# Patient Record
Sex: FEMALE | Race: WHITE | NOT HISPANIC OR LATINO | Employment: UNEMPLOYED | URBAN - NONMETROPOLITAN AREA
[De-identification: names, ages, dates, MRNs, and addresses within clinical notes are randomized per-mention and may not be internally consistent; named-entity substitution may affect disease eponyms.]

---
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
## 2023-01-03 ENCOUNTER — ROUTINE PRENATAL (OUTPATIENT)
Dept: OBSTETRICS AND GYNECOLOGY | Facility: CLINIC | Age: 18
End: 2023-01-03
Payer: COMMERCIAL

## 2023-01-03 VITALS — DIASTOLIC BLOOD PRESSURE: 84 MMHG | WEIGHT: 173 LBS | SYSTOLIC BLOOD PRESSURE: 126 MMHG | BODY MASS INDEX: 32.69 KG/M2

## 2023-01-03 DIAGNOSIS — Z34.03 ENCOUNTER FOR SUPERVISION OF NORMAL FIRST PREGNANCY IN THIRD TRIMESTER: ICD-10-CM

## 2023-01-03 DIAGNOSIS — O99.019 IRON DEFICIENCY ANEMIA OF PREGNANCY: ICD-10-CM

## 2023-01-03 DIAGNOSIS — R51.9 HEADACHE IN PREGNANCY, ANTEPARTUM, THIRD TRIMESTER: ICD-10-CM

## 2023-01-03 DIAGNOSIS — O99.820 GBS (GROUP B STREPTOCOCCUS CARRIER), +RV CULTURE, CURRENTLY PREGNANT: ICD-10-CM

## 2023-01-03 DIAGNOSIS — O26.893 HEADACHE IN PREGNANCY, ANTEPARTUM, THIRD TRIMESTER: ICD-10-CM

## 2023-01-03 DIAGNOSIS — D50.9 IRON DEFICIENCY ANEMIA OF PREGNANCY: ICD-10-CM

## 2023-01-03 DIAGNOSIS — Z3A.38 38 WEEKS GESTATION OF PREGNANCY: Primary | ICD-10-CM

## 2023-01-03 PROBLEM — Z3A.31 31 WEEKS GESTATION OF PREGNANCY: Status: RESOLVED | Noted: 2022-11-16 | Resolved: 2023-01-03

## 2023-01-03 PROCEDURE — 99213 OFFICE O/P EST LOW 20 MIN: CPT | Performed by: NURSE PRACTITIONER

## 2023-01-03 NOTE — PROGRESS NOTES
CC: Prenatal visit    Rand Santos is a 17 y.o.  at 38w4d.  Doing well.  No complaints.  She was encouraged to get induced at 39 weeks due to \"risk of increased  section\".  Discussed IOL risks along with consideration of favorable cervix.  Offered SVE today, she desires.  Denies contractions, LOF, or VB.  Reports good FM.     /84   Wt 78.5 kg (173 lb)   LMP  (LMP Unknown) Comment: patient believes last period was March  BMI 32.69 kg/m²   SVE: /-4  BSUS: fetus vertex            Problems (from 22 to present)     Problem Noted Resolved    GBS (group B Streptococcus carrier), +RV culture, currently pregnant 2022 by Leticia Sarmiento APRN No    Iron deficiency anemia of pregnancy 2022 by Carolina Cid APRN No    Headache in pregnancy, antepartum, third trimester 2022 by Leticia Sarmiento APRN No    Encounter for supervision of normal first pregnancy in third trimester 2022 by Leticia Sarmiento APRN No    Overview Signed 2022 10:34 AM by Leticia Sarmiento APRN     A pos/ Rubella immune / GBS @35weeks  DatinT US, ALLYSSA 23  Genetics: Neg, GIRL  Tdap: 10/28/22  Flu: Declines  Anatomy: @TPG, repeat for pyelectasis WNL   1h Glucola: Passed  H&H/Plts:   Lab Results   Component Value Date    HGB 9.1 (L) 10/28/2022    HCT 28.1 (L) 10/28/2022     10/28/2022     Breast/BC undecided                A/P: Rand Santos is a 17 y.o.  at 38w4d.  Pt would like to consider IOL over the next week and let us know at next appt what she decides   - RTC in 1 weeks     Diagnosis Plan   1. 38 weeks gestation of pregnancy        2. Encounter for supervision of normal first pregnancy in third trimester        3. Iron deficiency anemia of pregnancy        4. Headache in pregnancy, antepartum, third trimester        5. GBS (group B Streptococcus carrier), +RV culture, currently pregnant             Delmis Tao, ROSINA  1/3/2023  15:20 CST

## 2023-01-11 ENCOUNTER — ROUTINE PRENATAL (OUTPATIENT)
Dept: OBSTETRICS AND GYNECOLOGY | Facility: CLINIC | Age: 18
End: 2023-01-11
Payer: COMMERCIAL

## 2023-01-11 VITALS — WEIGHT: 171.8 LBS | SYSTOLIC BLOOD PRESSURE: 130 MMHG | DIASTOLIC BLOOD PRESSURE: 84 MMHG | BODY MASS INDEX: 32.46 KG/M2

## 2023-01-11 DIAGNOSIS — D50.9 IRON DEFICIENCY ANEMIA OF PREGNANCY: Primary | ICD-10-CM

## 2023-01-11 DIAGNOSIS — O99.019 IRON DEFICIENCY ANEMIA OF PREGNANCY: Primary | ICD-10-CM

## 2023-01-11 DIAGNOSIS — O99.820 GBS (GROUP B STREPTOCOCCUS CARRIER), +RV CULTURE, CURRENTLY PREGNANT: ICD-10-CM

## 2023-01-11 DIAGNOSIS — O26.893 HEADACHE IN PREGNANCY, ANTEPARTUM, THIRD TRIMESTER: ICD-10-CM

## 2023-01-11 DIAGNOSIS — Z34.03 ENCOUNTER FOR SUPERVISION OF NORMAL FIRST PREGNANCY IN THIRD TRIMESTER: ICD-10-CM

## 2023-01-11 DIAGNOSIS — R51.9 HEADACHE IN PREGNANCY, ANTEPARTUM, THIRD TRIMESTER: ICD-10-CM

## 2023-01-11 PROCEDURE — 99213 OFFICE O/P EST LOW 20 MIN: CPT | Performed by: STUDENT IN AN ORGANIZED HEALTH CARE EDUCATION/TRAINING PROGRAM

## 2023-01-11 RX ORDER — FAMOTIDINE 20 MG/1
20 TABLET, FILM COATED ORAL DAILY
Qty: 30 TABLET | Refills: 2 | Status: SHIPPED | OUTPATIENT
Start: 2023-01-11 | End: 2023-01-19 | Stop reason: HOSPADM

## 2023-01-17 ENCOUNTER — HOSPITAL ENCOUNTER (INPATIENT)
Facility: HOSPITAL | Age: 18
LOS: 2 days | Discharge: HOME | End: 2023-01-19
Attending: OBSTETRICS & GYNECOLOGY | Admitting: OBSTETRICS & GYNECOLOGY
Payer: COMMERCIAL

## 2023-01-17 ENCOUNTER — ANESTHESIA EVENT (OUTPATIENT)
Dept: LABOR AND DELIVERY | Facility: HOSPITAL | Age: 18
End: 2023-01-17
Payer: COMMERCIAL

## 2023-01-17 ENCOUNTER — ANESTHESIA (OUTPATIENT)
Dept: LABOR AND DELIVERY | Facility: HOSPITAL | Age: 18
End: 2023-01-17
Payer: COMMERCIAL

## 2023-01-17 PROBLEM — Z37.9 NORMAL LABOR: Status: ACTIVE | Noted: 2023-01-17

## 2023-01-17 LAB
ABO GROUP BLD: NORMAL
ALBUMIN SERPL-MCNC: 3.4 G/DL (ref 3.5–5.2)
ALBUMIN/GLOB SERPL: 1.2 G/DL
ALP SERPL-CCNC: 215 U/L (ref 39–117)
ALT SERPL W P-5'-P-CCNC: 8 U/L (ref 1–33)
AMPHET+METHAMPHET UR QL: NEGATIVE
AMPHETAMINES UR QL: NEGATIVE
ANION GAP SERPL CALCULATED.3IONS-SCNC: 12 MMOL/L (ref 5–15)
AST SERPL-CCNC: 15 U/L (ref 1–32)
BARBITURATES UR QL SCN: NEGATIVE
BENZODIAZ UR QL SCN: NEGATIVE
BILIRUB SERPL-MCNC: 0.2 MG/DL (ref 0–1.2)
BLD GP AB SCN SERPL QL: NEGATIVE
BUN SERPL-MCNC: 5 MG/DL (ref 6–20)
BUN/CREAT SERPL: 11.1 (ref 7–25)
BUPRENORPHINE SERPL-MCNC: NEGATIVE NG/ML
CALCIUM SPEC-SCNC: 8.8 MG/DL (ref 8.6–10.5)
CANNABINOIDS SERPL QL: NEGATIVE
CHLORIDE SERPL-SCNC: 105 MMOL/L (ref 98–107)
CO2 SERPL-SCNC: 20 MMOL/L (ref 22–29)
COCAINE UR QL: NEGATIVE
CREAT SERPL-MCNC: 0.45 MG/DL (ref 0.57–1)
CREAT UR-MCNC: 71.1 MG/DL
DEPRECATED RDW RBC AUTO: 50.3 FL (ref 37–54)
EGFRCR SERPLBLD CKD-EPI 2021: 142.3 ML/MIN/1.73
ERYTHROCYTE [DISTWIDTH] IN BLOOD BY AUTOMATED COUNT: 16.1 % (ref 12.3–15.4)
GLOBULIN UR ELPH-MCNC: 2.9 GM/DL
GLUCOSE SERPL-MCNC: 85 MG/DL (ref 65–99)
HCT VFR BLD AUTO: 36 % (ref 34–46.6)
HGB BLD-MCNC: 11.7 G/DL (ref 12–15.9)
Lab: NORMAL
MCH RBC QN AUTO: 27.8 PG (ref 26.6–33)
MCHC RBC AUTO-ENTMCNC: 32.5 G/DL (ref 31.5–35.7)
MCV RBC AUTO: 85.5 FL (ref 79–97)
METHADONE UR QL SCN: NEGATIVE
OPIATES UR QL: NEGATIVE
OXYCODONE UR QL SCN: NEGATIVE
PCP UR QL SCN: NEGATIVE
PLATELET # BLD AUTO: 197 10*3/MM3 (ref 140–450)
PMV BLD AUTO: 10.8 FL (ref 6–12)
POTASSIUM SERPL-SCNC: 4.2 MMOL/L (ref 3.5–5.2)
PROPOXYPH UR QL: NEGATIVE
PROT ?TM UR-MCNC: 18.4 MG/DL
PROT SERPL-MCNC: 6.3 G/DL (ref 6–8.5)
PROT/CREAT UR: 258.8 MG/G CREA (ref 0–200)
RBC # BLD AUTO: 4.21 10*6/MM3 (ref 3.77–5.28)
RH BLD: POSITIVE
SODIUM SERPL-SCNC: 137 MMOL/L (ref 136–145)
T&S EXPIRATION DATE: NORMAL
TRICYCLICS UR QL SCN: NEGATIVE
WBC NRBC COR # BLD: 9.06 10*3/MM3 (ref 3.4–10.8)

## 2023-01-17 PROCEDURE — 82570 ASSAY OF URINE CREATININE: CPT | Performed by: OBSTETRICS & GYNECOLOGY

## 2023-01-17 PROCEDURE — C1755 CATHETER, INTRASPINAL: HCPCS | Performed by: NURSE ANESTHETIST, CERTIFIED REGISTERED

## 2023-01-17 PROCEDURE — 80053 COMPREHEN METABOLIC PANEL: CPT | Performed by: OBSTETRICS & GYNECOLOGY

## 2023-01-17 PROCEDURE — 85027 COMPLETE CBC AUTOMATED: CPT | Performed by: OBSTETRICS & GYNECOLOGY

## 2023-01-17 PROCEDURE — S0260 H&P FOR SURGERY: HCPCS | Performed by: OBSTETRICS & GYNECOLOGY

## 2023-01-17 PROCEDURE — 0 PENICILLIN G POTASSIUM PER 600000 UNITS: Performed by: OBSTETRICS & GYNECOLOGY

## 2023-01-17 PROCEDURE — 86901 BLOOD TYPING SEROLOGIC RH(D): CPT | Performed by: OBSTETRICS & GYNECOLOGY

## 2023-01-17 PROCEDURE — 25010000002 BUTORPHANOL PER 1 MG: Performed by: OBSTETRICS & GYNECOLOGY

## 2023-01-17 PROCEDURE — 51703 INSERT BLADDER CATH COMPLEX: CPT

## 2023-01-17 PROCEDURE — 3E033VJ INTRODUCTION OF OTHER HORMONE INTO PERIPHERAL VEIN, PERCUTANEOUS APPROACH: ICD-10-PCS | Performed by: STUDENT IN AN ORGANIZED HEALTH CARE EDUCATION/TRAINING PROGRAM

## 2023-01-17 PROCEDURE — 25010000002 FENTANYL CITRATE (PF) 100 MCG/2ML SOLUTION: Performed by: NURSE ANESTHETIST, CERTIFIED REGISTERED

## 2023-01-17 PROCEDURE — 86900 BLOOD TYPING SEROLOGIC ABO: CPT | Performed by: OBSTETRICS & GYNECOLOGY

## 2023-01-17 PROCEDURE — 86850 RBC ANTIBODY SCREEN: CPT | Performed by: OBSTETRICS & GYNECOLOGY

## 2023-01-17 PROCEDURE — 59410 OBSTETRICAL CARE: CPT | Performed by: STUDENT IN AN ORGANIZED HEALTH CARE EDUCATION/TRAINING PROGRAM

## 2023-01-17 PROCEDURE — 84156 ASSAY OF PROTEIN URINE: CPT | Performed by: OBSTETRICS & GYNECOLOGY

## 2023-01-17 PROCEDURE — 80306 DRUG TEST PRSMV INSTRMNT: CPT | Performed by: OBSTETRICS & GYNECOLOGY

## 2023-01-17 PROCEDURE — C1755 CATHETER, INTRASPINAL: HCPCS

## 2023-01-17 RX ORDER — IBUPROFEN 800 MG/1
800 TABLET ORAL EVERY 8 HOURS
Status: DISCONTINUED | OUTPATIENT
Start: 2023-01-17 | End: 2023-01-19 | Stop reason: HOSPADM

## 2023-01-17 RX ORDER — FENTANYL CITRATE 50 UG/ML
INJECTION, SOLUTION INTRAMUSCULAR; INTRAVENOUS AS NEEDED
Status: DISCONTINUED | OUTPATIENT
Start: 2023-01-17 | End: 2023-01-17 | Stop reason: SURG

## 2023-01-17 RX ORDER — PRENATAL VIT/IRON FUM/FOLIC AC 27MG-0.8MG
1 TABLET ORAL DAILY
Status: DISCONTINUED | OUTPATIENT
Start: 2023-01-18 | End: 2023-01-19 | Stop reason: HOSPADM

## 2023-01-17 RX ORDER — ACETAMINOPHEN 500 MG
1000 TABLET ORAL EVERY 6 HOURS
Status: DISCONTINUED | OUTPATIENT
Start: 2023-01-18 | End: 2023-01-19 | Stop reason: HOSPADM

## 2023-01-17 RX ORDER — ONDANSETRON 4 MG/1
4 TABLET, FILM COATED ORAL EVERY 6 HOURS PRN
Status: DISCONTINUED | OUTPATIENT
Start: 2023-01-17 | End: 2023-01-19 | Stop reason: HOSPADM

## 2023-01-17 RX ORDER — MISOPROSTOL 200 UG/1
800 TABLET ORAL AS NEEDED
Status: DISCONTINUED | OUTPATIENT
Start: 2023-01-17 | End: 2023-01-17 | Stop reason: HOSPADM

## 2023-01-17 RX ORDER — FERROUS SULFATE TAB EC 324 MG (65 MG FE EQUIVALENT) 324 (65 FE) MG
324 TABLET DELAYED RESPONSE ORAL
Status: DISCONTINUED | OUTPATIENT
Start: 2023-01-18 | End: 2023-01-19 | Stop reason: HOSPADM

## 2023-01-17 RX ORDER — OXYTOCIN/0.9 % SODIUM CHLORIDE 30/500 ML
2 PLASTIC BAG, INJECTION (ML) INTRAVENOUS
Status: DISCONTINUED | OUTPATIENT
Start: 2023-01-17 | End: 2023-01-18

## 2023-01-17 RX ORDER — SODIUM CHLORIDE 0.9 % (FLUSH) 0.9 %
1-10 SYRINGE (ML) INJECTION AS NEEDED
Status: DISCONTINUED | OUTPATIENT
Start: 2023-01-17 | End: 2023-01-19 | Stop reason: HOSPADM

## 2023-01-17 RX ORDER — IBUPROFEN 800 MG/1
800 TABLET ORAL EVERY 8 HOURS
Status: DISCONTINUED | OUTPATIENT
Start: 2023-01-17 | End: 2023-01-17 | Stop reason: HOSPADM

## 2023-01-17 RX ORDER — HYDROCORTISONE 25 MG/G
1 CREAM TOPICAL AS NEEDED
Status: DISCONTINUED | OUTPATIENT
Start: 2023-01-17 | End: 2023-01-19 | Stop reason: HOSPADM

## 2023-01-17 RX ORDER — BUTORPHANOL TARTRATE 1 MG/ML
2 INJECTION, SOLUTION INTRAMUSCULAR; INTRAVENOUS
Status: DISCONTINUED | OUTPATIENT
Start: 2023-01-17 | End: 2023-01-17 | Stop reason: HOSPADM

## 2023-01-17 RX ORDER — ONDANSETRON 2 MG/ML
4 INJECTION INTRAMUSCULAR; INTRAVENOUS EVERY 6 HOURS PRN
Status: DISCONTINUED | OUTPATIENT
Start: 2023-01-17 | End: 2023-01-17 | Stop reason: HOSPADM

## 2023-01-17 RX ORDER — SODIUM CHLORIDE 0.9 % (FLUSH) 0.9 %
3 SYRINGE (ML) INJECTION EVERY 12 HOURS SCHEDULED
Status: DISCONTINUED | OUTPATIENT
Start: 2023-01-17 | End: 2023-01-17 | Stop reason: HOSPADM

## 2023-01-17 RX ORDER — ONDANSETRON 2 MG/ML
4 INJECTION INTRAMUSCULAR; INTRAVENOUS EVERY 6 HOURS PRN
Status: DISCONTINUED | OUTPATIENT
Start: 2023-01-17 | End: 2023-01-19 | Stop reason: HOSPADM

## 2023-01-17 RX ORDER — OXYTOCIN/0.9 % SODIUM CHLORIDE 30/500 ML
999 PLASTIC BAG, INJECTION (ML) INTRAVENOUS ONCE
Status: COMPLETED | OUTPATIENT
Start: 2023-01-17 | End: 2023-01-17

## 2023-01-17 RX ORDER — ONDANSETRON 4 MG/1
4 TABLET, FILM COATED ORAL EVERY 6 HOURS PRN
Status: DISCONTINUED | OUTPATIENT
Start: 2023-01-17 | End: 2023-01-17 | Stop reason: HOSPADM

## 2023-01-17 RX ORDER — OXYTOCIN/0.9 % SODIUM CHLORIDE 30/500 ML
2 PLASTIC BAG, INJECTION (ML) INTRAVENOUS
Status: DISCONTINUED | OUTPATIENT
Start: 2023-01-17 | End: 2023-01-17

## 2023-01-17 RX ORDER — PROMETHAZINE HYDROCHLORIDE 25 MG/1
25 TABLET ORAL EVERY 6 HOURS PRN
Status: DISCONTINUED | OUTPATIENT
Start: 2023-01-17 | End: 2023-01-17 | Stop reason: HOSPADM

## 2023-01-17 RX ORDER — CARBOPROST TROMETHAMINE 250 UG/ML
250 INJECTION, SOLUTION INTRAMUSCULAR AS NEEDED
Status: DISCONTINUED | OUTPATIENT
Start: 2023-01-17 | End: 2023-01-17 | Stop reason: HOSPADM

## 2023-01-17 RX ORDER — SODIUM CHLORIDE, SODIUM LACTATE, POTASSIUM CHLORIDE, CALCIUM CHLORIDE 600; 310; 30; 20 MG/100ML; MG/100ML; MG/100ML; MG/100ML
125 INJECTION, SOLUTION INTRAVENOUS CONTINUOUS
Status: DISCONTINUED | OUTPATIENT
Start: 2023-01-17 | End: 2023-01-18

## 2023-01-17 RX ORDER — DOCUSATE SODIUM 100 MG/1
100 CAPSULE, LIQUID FILLED ORAL 2 TIMES DAILY
Status: DISCONTINUED | OUTPATIENT
Start: 2023-01-17 | End: 2023-01-19 | Stop reason: HOSPADM

## 2023-01-17 RX ORDER — PROMETHAZINE HYDROCHLORIDE 12.5 MG/1
12.5 SUPPOSITORY RECTAL EVERY 6 HOURS PRN
Status: DISCONTINUED | OUTPATIENT
Start: 2023-01-17 | End: 2023-01-17 | Stop reason: HOSPADM

## 2023-01-17 RX ORDER — ACETAMINOPHEN 500 MG
1000 TABLET ORAL EVERY 6 HOURS
Status: DISCONTINUED | OUTPATIENT
Start: 2023-01-17 | End: 2023-01-17 | Stop reason: HOSPADM

## 2023-01-17 RX ORDER — SODIUM CHLORIDE 0.9 % (FLUSH) 0.9 %
3-10 SYRINGE (ML) INJECTION AS NEEDED
Status: DISCONTINUED | OUTPATIENT
Start: 2023-01-17 | End: 2023-01-17 | Stop reason: HOSPADM

## 2023-01-17 RX ORDER — LIDOCAINE HYDROCHLORIDE AND EPINEPHRINE 15; 5 MG/ML; UG/ML
INJECTION, SOLUTION EPIDURAL AS NEEDED
Status: DISCONTINUED | OUTPATIENT
Start: 2023-01-17 | End: 2023-01-17 | Stop reason: SURG

## 2023-01-17 RX ORDER — BUTORPHANOL TARTRATE 1 MG/ML
1 INJECTION, SOLUTION INTRAMUSCULAR; INTRAVENOUS
Status: DISCONTINUED | OUTPATIENT
Start: 2023-01-17 | End: 2023-01-17 | Stop reason: HOSPADM

## 2023-01-17 RX ORDER — CALCIUM CARBONATE 200(500)MG
2 TABLET,CHEWABLE ORAL 3 TIMES DAILY PRN
Status: DISCONTINUED | OUTPATIENT
Start: 2023-01-17 | End: 2023-01-19 | Stop reason: HOSPADM

## 2023-01-17 RX ORDER — OXYTOCIN/0.9 % SODIUM CHLORIDE 30/500 ML
250 PLASTIC BAG, INJECTION (ML) INTRAVENOUS CONTINUOUS
Status: ACTIVE | OUTPATIENT
Start: 2023-01-17 | End: 2023-01-17

## 2023-01-17 RX ORDER — METHYLERGONOVINE MALEATE 0.2 MG/ML
200 INJECTION INTRAVENOUS ONCE AS NEEDED
Status: DISCONTINUED | OUTPATIENT
Start: 2023-01-17 | End: 2023-01-17 | Stop reason: HOSPADM

## 2023-01-17 RX ORDER — BISACODYL 10 MG
10 SUPPOSITORY, RECTAL RECTAL DAILY PRN
Status: DISCONTINUED | OUTPATIENT
Start: 2023-01-18 | End: 2023-01-19 | Stop reason: HOSPADM

## 2023-01-17 RX ORDER — LIDOCAINE HYDROCHLORIDE 10 MG/ML
5 INJECTION, SOLUTION EPIDURAL; INFILTRATION; INTRACAUDAL; PERINEURAL AS NEEDED
Status: DISCONTINUED | OUTPATIENT
Start: 2023-01-17 | End: 2023-01-17 | Stop reason: HOSPADM

## 2023-01-17 RX ADMIN — OXYTOCIN-SODIUM CHLORIDE 0.9% IV SOLN 30 UNIT/500ML 999 ML/HR: 30-0.9/5 SOLUTION at 19:43

## 2023-01-17 RX ADMIN — OXYTOCIN-SODIUM CHLORIDE 0.9% IV SOLN 30 UNIT/500ML 250 ML/HR: 30-0.9/5 SOLUTION at 20:04

## 2023-01-17 RX ADMIN — SODIUM CHLORIDE, POTASSIUM CHLORIDE, SODIUM LACTATE AND CALCIUM CHLORIDE 1000 ML: 600; 310; 30; 20 INJECTION, SOLUTION INTRAVENOUS at 16:23

## 2023-01-17 RX ADMIN — BUTORPHANOL TARTRATE 2 MG: 1 INJECTION, SOLUTION INTRAMUSCULAR; INTRAVENOUS at 14:54

## 2023-01-17 RX ADMIN — OXYTOCIN-SODIUM CHLORIDE 0.9% IV SOLN 30 UNIT/500ML 2 MILLI-UNITS/MIN: 30-0.9/5 SOLUTION at 12:44

## 2023-01-17 RX ADMIN — IBUPROFEN 800 MG: 800 TABLET, FILM COATED ORAL at 23:02

## 2023-01-17 RX ADMIN — DOCUSATE SODIUM 100 MG: 100 CAPSULE, LIQUID FILLED ORAL at 23:02

## 2023-01-17 RX ADMIN — ACETAMINOPHEN 1000 MG: 500 TABLET ORAL at 23:02

## 2023-01-17 RX ADMIN — FENTANYL CITRATE 200 MCG: 50 INJECTION, SOLUTION INTRAMUSCULAR; INTRAVENOUS at 16:49

## 2023-01-17 RX ADMIN — SODIUM CHLORIDE 5 MILLION UNITS: 9 INJECTION, SOLUTION INTRAVENOUS at 10:15

## 2023-01-17 RX ADMIN — SODIUM CHLORIDE 3 MILLION UNITS: 9 INJECTION, SOLUTION INTRAVENOUS at 14:02

## 2023-01-17 RX ADMIN — SODIUM CHLORIDE 3 MILLION UNITS: 9 INJECTION, SOLUTION INTRAVENOUS at 18:47

## 2023-01-17 RX ADMIN — LIDOCAINE HYDROCHLORIDE AND EPINEPHRINE 3 ML: 15; 5 INJECTION, SOLUTION EPIDURAL at 16:44

## 2023-01-17 RX ADMIN — SODIUM CHLORIDE, POTASSIUM CHLORIDE, SODIUM LACTATE AND CALCIUM CHLORIDE 125 ML/HR: 600; 310; 30; 20 INJECTION, SOLUTION INTRAVENOUS at 09:55

## 2023-01-17 NOTE — ANESTHESIA PROCEDURE NOTES
Labor Epidural      Patient reassessed immediately prior to procedure    Patient location during procedure: OB  Performed By  CRNA/BRIAN: Bayron Jolly CRNA  Preanesthetic Checklist  Completed: patient identified, IV checked, site marked, risks and benefits discussed, surgical consent, monitors and equipment checked, pre-op evaluation and timeout performed  Prep:  Pt Position:sitting  Sterile Tech:gloves, cap, gown, mask and sterile barrier  Prep:DuraPrep  Monitoring:blood pressure monitoring and continuous pulse oximetry  Epidural Block Procedure:  Approach:midline  Guidance:landmark technique and palpation technique  Location:L3-L4  Needle Type:Tuohy  Needle Gauge:17 G  Loss of Resistance Medium: air  Loss of Resistance: 6cm  Cath Depth at skin:12 cm  Paresthesia: none  Aspiration:negative  Test Dose:negative  Number of Attempts: 1  Post Assessment:  Dressing:occlusive dressing applied and secured with tape  Pt Tolerance:patient tolerated the procedure well with no apparent complications  Complications:no

## 2023-01-17 NOTE — ANESTHESIA PREPROCEDURE EVALUATION
Anesthesia Evaluation     NPO Solid Status: > 8 hours  NPO Liquid Status: > 2 hours           Airway   Mallampati: II  TM distance: >3 FB  Neck ROM: full  no difficulty expected  Dental - normal exam     Pulmonary - normal exam   Cardiovascular - normal exam        Neuro/Psych  (+) headaches, numbness,    GI/Hepatic/Renal/Endo      Musculoskeletal     Abdominal    Substance History      OB/GYN    (+) Pregnant,         Other                        Anesthesia Plan    ASA 2     epidural       Anesthetic plan, risks, benefits, and alternatives have been provided, discussed and informed consent has been obtained with: patient.        CODE STATUS:    Code Status (Patient has no pulse and is not breathing): CPR (Attempt to Resuscitate)  Medical Interventions (Patient has pulse or is breathing): Full

## 2023-01-17 NOTE — H&P
[facility]  HISTORY & PHYSICAL - Obstetrics    Name: Rand Santos  MRN: [id number]  Location: L769/1  Date: 2023   CSN: [id number]      CHIEF COMPLAINT:  Contractions    HISTORY OF PRESENT ILLNESS  Rand Santos is a 17 y.o.  at 40w4d who presents with a chief complaint of contractions.  She is scheduled for IOL tomorrow.  Today denies LOF or vaginal bleeding.  Reports good FM.    Patient denies any chest pain, palpitations, headaches, lightheadedness, shortness of breath, cough, nausea, vomiting, diarrhea, constipation, fever, or chills.    ROS  Review of Systems   Constitutional: Negative.    HENT: Negative.    Eyes: Negative.    Respiratory: Negative.    Cardiovascular: Negative.    Gastrointestinal: Negative.    Endocrine: Negative.    Genitourinary: Negative.    Musculoskeletal: Negative.    Skin: Negative.    Allergic/Immunologic: Negative.    Neurological: Negative.    Hematological: Negative.    Psychiatric/Behavioral: Negative.      PRENATAL LAB RESULTS  Prenatal labs reviewed  External Prenatal Results     Pregnancy Outside Results - Transcribed From Office Records - See Scanned Records For Details     Test Value Date Time    ABO  A  22 1116    Rh  Positive  22 1116    Antibody Screen  Negative  22 1116    Varicella IgG       Rubella  3.40 index 22 1116    Hgb  9.1 g/dL 10/28/22 1016       11.6 g/dL 22 1116    Hct  28.1 % 10/28/22 1016       37.8 % 22 1116    Glucose Fasting GTT       Glucose Tolerance Test 1 hour       Glucose Tolerance Test 3 hour       Gonorrhea (discrete)  Negative  22 1059    Chlamydia (discrete)  Negative  22 1059    RPR  Non-Reactive  22 1116    VDRL       Syphilis Antibody       HBsAg  Non-Reactive  22 1116    Herpes Simplex Virus PCR       Herpes Simplex VIrus Culture       HIV  Non-Reactive  22 1116    Hep C RNA Quant PCR       Hep C Antibody  Non-Reactive   22 1116    AFP       Group B Strep  Positive  22 0859    GBS Susceptibility to Clindamycin       GBS Susceptibility to Erythromycin       Fetal Fibronectin       Genetic Testing, Maternal Blood             Drug Screening     Test Value Date Time    Urine Drug Screen       Amphetamine Screen  Negative  22 1059    Barbiturate Screen  Negative  22 1059    Benzodiazepine Screen  Negative  22 1059    Methadone Screen  Negative  22 1059    Phencyclidine Screen  Negative  22 1059    Opiates Screen  Negative  22 1059    THC Screen  Negative  22 1059    Cocaine Screen       Propoxyphene Screen  Negative  22 1059    Buprenorphine Screen  Negative  22 1059    Methamphetamine Screen       Oxycodone Screen  Negative  22 1059    Tricyclic Antidepressants Screen  Negative  22 1059          Legend    ^: Historical                      PRENATAL RISK FACTORS   Problems (from 22 to present)     Problem Noted Resolved    GBS (group B Streptococcus carrier), +RV culture, currently pregnant 2022 by Leticia Sarmiento APRN No    Iron deficiency anemia of pregnancy 2022 by Carolina Cid APRN No    Headache in pregnancy, antepartum, third trimester 2022 by Leticia Sarmiento APRN No    Encounter for supervision of normal first pregnancy in third trimester 2022 by Leticia Sarmiento APRN No    Overview Signed 2022 10:34 AM by Leticia Sarmiento APRN     A pos/ Rubella immune / GBS @35weeks  DatinT US, ALLYSSA 23  Genetics: Neg, GIRL  Tdap: 10/28/22  Flu: Declines  Anatomy: @TPG, repeat for pyelectasis WNL   1h Glucola: Passed  H&H/Plts:   Lab Results   Component Value Date    HGB 9.1 (L) 10/28/2022    HCT 28.1 (L) 10/28/2022     10/28/2022     Breast/BC undecided              OB HISTORY  OB History    Para Term  AB Living   1 0 0 0 0 0   SAB IAB  Ectopic Molar Multiple Live Births   0 0 0 0 0 0      # Outcome Date GA Lbr Elliot/2nd Weight Sex Delivery Anes PTL Lv   1 Current              GYN HISTORY  Denies h/o sexually transmitted infections/pelvic inflammatory disease  Denies h/o gynecologic surgeries, including biopsies of the cervix    PAST MEDICAL HISTORY  Past Medical History:   Diagnosis Date   • Acne vulgaris      PAST SURGICAL HISTORY  No past surgical history on file.    FAMILY HISTORY  Family History   Problem Relation Age of Onset   • No Known Problems Father    • No Known Problems Brother    • No Known Problems Sister    • Thyroid disease Maternal Grandmother         disorder   • Uterine cancer Maternal Grandmother    • Diabetes Maternal Aunt    • Heart disease Maternal Uncle    • Uterine cancer Other      SOCIAL HISTORY  Social History     Socioeconomic History   • Marital status: Single   Tobacco Use   • Smoking status: Never   • Smokeless tobacco: Never   • Tobacco comments:     dad smokes outside   Substance and Sexual Activity   • Alcohol use: No   • Drug use: No   • Sexual activity: Yes     Partners: Male     Birth control/protection: None     ALLERGIES  No Known Allergies    HOME MEDICATIONS  Prior to Admission medications    Medication Sig Start Date End Date Taking? Authorizing Provider   famotidine (Pepcid) 20 MG tablet Take 1 tablet by mouth Daily. 1/11/23 1/11/24  Em Barakat,    ferrous sulfate 325 (65 FE) MG tablet Take 1 tablet by mouth 2 (Two) Times a Day. 10/31/22   Erica Alberts MD   ondansetron ODT (Zofran ODT) 8 MG disintegrating tablet Place 1 tablet on the tongue Every 8 (Eight) Hours As Needed for Nausea or Vomiting. 8/17/22   Leticia Sarmiento APRN   Prenatal Vit-Fe Fumarate-FA (Prenatal Vitamin and Mineral) 28-0.8 MG tablet Take 1 tablet by mouth Daily. 5/13/22   Leticia Sarmiento APRN     PHYSICAL EXAM  /92   Pulse 85   Temp 98.5 °F (36.9 °C) (Oral)   LMP  (LMP  Unknown) Comment: patient believes last period was March  SpO2 98%   General: No acute distress. Well developed, well nourished. Pleasant.  Heart: Regular rate and rhythm. No murmurs, rubs, or gallops  Lungs: Clear to auscultation bilaterally. No wheezes, rales, or rhonchi.  Abdomen: Soft, nontender to palpation, enlarged by gravid uterus.    NST Review  Indication: Labor  FHT: Baseline 145 bpm, moderate variability, pos accelerations, neg decelerations.  [place]: Irregular contractions  Impression: FHT Cat 1     SVE per RN: 2-3/70/-2, vertex    IMPRESSION  Rand Santos is a 17 y.o.  at 40w4d admitted with latent labor; will augment as necessary.    PLAN  1.  IUP at 40w4d with labor  - Admit: Labor and Delivery  - Attending: Dr. Alberts  - Condition: Stable  - Vitals: per protocol  - Activity: ad celi  - Nursing: Continuous electronic fetal monitoring, as per protocol  - Diet: Clears  - IV fluids:  mL/hr  - Meds: Pitocin if needed  - Allergies: NKDA  - Labs: CBC, T&S, UDS  - GBS: POS.  Antibiotics: PCN  - Rand Santos and I have discussed pain goals for this hospitalization after reviewing her current clinical condition, medical history and prior pain experiences.  The goal is to keep her pain level appropriate.  Patient may have epidural if desired.  - Anticipate     This document has been electronically signed by Erica Alberts MD on 2023 09:20 CST.

## 2023-01-17 NOTE — PLAN OF CARE
Problem: Adult Inpatient Plan of Care  Goal: Plan of Care Review  Outcome: Ongoing, Progressing  Flowsheets (Taken 1/17/2023 4398)  Progress: improving  Plan of Care Reviewed With:   spouse   patient  Outcome Evaluation: Pt is 5/100%/0 with arom of clear fluid. GBS +, treated x2 so far. Pt is having a girl and naming her Melissa May. She plans to breastfeed. Has an epidural and a rowe catheter.

## 2023-01-18 PROCEDURE — 0HQ9XZZ REPAIR PERINEUM SKIN, EXTERNAL APPROACH: ICD-10-PCS | Performed by: STUDENT IN AN ORGANIZED HEALTH CARE EDUCATION/TRAINING PROGRAM

## 2023-01-18 PROCEDURE — 0503F POSTPARTUM CARE VISIT: CPT | Performed by: STUDENT IN AN ORGANIZED HEALTH CARE EDUCATION/TRAINING PROGRAM

## 2023-01-18 RX ADMIN — IBUPROFEN 800 MG: 800 TABLET, FILM COATED ORAL at 14:33

## 2023-01-18 RX ADMIN — Medication 1 APPLICATION: at 15:24

## 2023-01-18 RX ADMIN — IBUPROFEN 800 MG: 800 TABLET, FILM COATED ORAL at 09:12

## 2023-01-18 RX ADMIN — PRENATAL VIT W/ FE FUMARATE-FA TAB 27-0.8 MG 1 TABLET: 27-0.8 TAB at 09:12

## 2023-01-18 RX ADMIN — ACETAMINOPHEN 1000 MG: 500 TABLET ORAL at 05:08

## 2023-01-18 RX ADMIN — FERROUS SULFATE TAB EC 324 MG (65 MG FE EQUIVALENT) 324 MG: 324 (65 FE) TABLET DELAYED RESPONSE at 09:12

## 2023-01-18 RX ADMIN — DOCUSATE SODIUM 100 MG: 100 CAPSULE, LIQUID FILLED ORAL at 09:12

## 2023-01-18 RX ADMIN — ACETAMINOPHEN 1000 MG: 500 TABLET ORAL at 18:18

## 2023-01-18 RX ADMIN — ACETAMINOPHEN 1000 MG: 500 TABLET ORAL at 12:03

## 2023-01-18 RX ADMIN — DOCUSATE SODIUM 100 MG: 100 CAPSULE, LIQUID FILLED ORAL at 20:55

## 2023-01-18 NOTE — PLAN OF CARE
Goal Outcome Evaluation:  Plan of Care Reviewed With: patient, significant other           Outcome Evaluation: FF, VSS, IV out, lochia light, ambulating in room, voiding, pain tolerated with scheduled meds

## 2023-01-18 NOTE — PROGRESS NOTES
[facility]  Progress Note - Obstetrics    Name: Rand Santos  MRN: [id number]  Location: M757/1  Date: 2023  CSN: [id number]       PPD #1 s/p  at 40 with 4 with term labor     Patient seen and examined.  Denies headache, dizziness, chest pain, shortness of breath, nausea, vomiting.  Minimal/moderate lochia.  OOB and ambulating.  Tolerating regular diet.  Voiding without difficulty.  Breast feeding.    PHYSICAL EXAM  /85 (BP Location: Left arm, Patient Position: Lying)   Pulse 81   Temp 98.4 °F (36.9 °C) (Oral)   Resp 18   LMP  (LMP Unknown) Comment: patient believes last period was March  SpO2 98%   Breastfeeding Yes   General: AAOx3, no apparent distress.  Lungs: no increased work of breathing  CV: Acceptable rate, regular rhythm  Abdomen: +BS, soft, nondistended, uterine fundus firm, nontender, and below umbilicus.  Lower extremities: nontender, trace bilateral edema.     IMPRESSION  Rand Santos is a 17 y.o.  PPD #1 s/p .      PLAN  1.  Postpartum s/p   - Continue routine postpartum care.  - Diet: Pregnancy/breastfeeding  - DVT prophylaxis: SCDs, ambulation  - Breastfeeding  - Birth control options were discussed and patient decided on patch at 4-6 weeks PP  - Discharge to home today.  Follow-up in 2 weeks (telephone visit), 6 weeks with OB/GYN provider.       This document has been electronically signed by Em Barakat DO on 2023 08:07 CST

## 2023-01-18 NOTE — PAYOR COMM NOTE
"Ani Quispe  [facility]  Case Management Extender  [phone] phone  [phone] fax    Michaela Hermosillo May (17 y.o. Female)     Date of Birth   2005    Social Security Number       Address   [address] [id number]    Home Phone   [phone]    MRN   [id number]       Scientology   Unknown    Marital Status   Single                            Admission Date   1/17/23    Admission Type   Elective    Admitting Provider   Erica Alberts MD    Attending Provider   Erica Alberts MD    Department, Room/Bed   [facility] MOTHER BABY, M757/1       Discharge Date       Discharge Disposition       Discharge Destination                               Attending Provider: Erica Alberts MD    Allergies: No Known Allergies    Isolation: None   Infection: None   Code Status: CPR    Ht: 154.9 cm (61\")   Wt: 77.9 kg (171 lb 12.8 oz)    Admission Cmt: None   Principal Problem: Normal labor [O80,Z37.9]                 Active Insurance as of 1/17/2023     Primary Coverage     Payor Plan Insurance Group Employer/Plan Group    [place]      Payor Plan Address Payor Plan Phone Number Payor Plan Fax Number Effective Dates    [address] [address] [phone]  10/21/2016 - None Entered    [facility] [address]       Subscriber Name Subscriber Birth Date Member ID       MICHAELA HERMOSILLO MAY 2005 [id number]                 Emergency Contacts      (Rel.) Home Phone Work Phone Mobile Phone    Shelly Hermosillo (Mother) [phone] -- [phone]               History & Physical      Erica Alberts MD at 01/17/23 0920          [facility]  HISTORY & PHYSICAL - Obstetrics    Name: Michaela Hermosillo  MRN: [id number]  Location: L769/1  Date: 1/17/2023   CSN: [id number]      CHIEF COMPLAINT:  Contractions    HISTORY OF PRESENT ILLNESS  Michaela " Gabby Santos is a 17 y.o.  at 40w4d who presents with a chief complaint of contractions.  She is scheduled for IOL tomorrow.  Today denies LOF or vaginal bleeding.  Reports good FM.    Patient denies any chest pain, palpitations, headaches, lightheadedness, shortness of breath, cough, nausea, vomiting, diarrhea, constipation, fever, or chills.    ROS  Review of Systems   Constitutional: Negative.    HENT: Negative.    Eyes: Negative.    Respiratory: Negative.    Cardiovascular: Negative.    Gastrointestinal: Negative.    Endocrine: Negative.    Genitourinary: Negative.    Musculoskeletal: Negative.    Skin: Negative.    Allergic/Immunologic: Negative.    Neurological: Negative.    Hematological: Negative.    Psychiatric/Behavioral: Negative.      PRENATAL LAB RESULTS  Prenatal labs reviewed  External Prenatal Results     Pregnancy Outside Results - Transcribed From Office Records - See Scanned Records For Details     Test Value Date Time    ABO  A  22 1116    Rh  Positive  22 1116    Antibody Screen  Negative  22 1116    Varicella IgG       Rubella  3.40 index 22 1116    Hgb  9.1 g/dL 10/28/22 1016       11.6 g/dL 22 1116    Hct  28.1 % 10/28/22 1016       37.8 % 22 1116    Glucose Fasting GTT       Glucose Tolerance Test 1 hour       Glucose Tolerance Test 3 hour       Gonorrhea (discrete)  Negative  22 1059    Chlamydia (discrete)  Negative  22 1059    RPR  Non-Reactive  22 1116    VDRL       Syphilis Antibody       HBsAg  Non-Reactive  22 1116    Herpes Simplex Virus PCR       Herpes Simplex VIrus Culture       HIV  Non-Reactive  22 1116    Hep C RNA Quant PCR       Hep C Antibody  Non-Reactive  22 1116    AFP       Group B Strep  Positive  22 0859    GBS Susceptibility to Clindamycin       GBS Susceptibility to Erythromycin       Fetal Fibronectin       Genetic Testing, Maternal Blood             Drug Screening     Test Value Date  Time    Urine Drug Screen       Amphetamine Screen  Negative  22 1059    Barbiturate Screen  Negative  22 1059    Benzodiazepine Screen  Negative  22 1059    Methadone Screen  Negative  22 1059    Phencyclidine Screen  Negative  22 1059    Opiates Screen  Negative  22 1059    THC Screen  Negative  22 1059    Cocaine Screen       Propoxyphene Screen  Negative  22 1059    Buprenorphine Screen  Negative  22 1059    Methamphetamine Screen       Oxycodone Screen  Negative  22 1059    Tricyclic Antidepressants Screen  Negative  22 1059          Legend    ^: Historical                      PRENATAL RISK FACTORS   Problems (from 22 to present)     Problem Noted Resolved    GBS (group B Streptococcus carrier), +RV culture, currently pregnant 2022 by Leticia Sarmiento APRN No    Iron deficiency anemia of pregnancy 2022 by Carolina Cid APRN No    Headache in pregnancy, antepartum, third trimester 2022 by Leticia Sarmiento APRN No    Encounter for supervision of normal first pregnancy in third trimester 2022 by Leticia Sarmiento APRN No    Overview Signed 2022 10:34 AM by Leticia Sarmiento APRN     A pos/ Rubella immune / GBS @35weeks  DatinT US, ALLYSSA 23  Genetics: Neg, GIRL  Tdap: 10/28/22  Flu: Declines  Anatomy: @TPG, repeat for pyelectasis WNL   1h Glucola: Passed  H&H/Plts:   Lab Results   Component Value Date    HGB 9.1 (L) 10/28/2022    HCT 28.1 (L) 10/28/2022     10/28/2022     Breast/BC undecided              OB HISTORY  OB History    Para Term  AB Living   1 0 0 0 0 0   SAB IAB Ectopic Molar Multiple Live Births   0 0 0 0 0 0      # Outcome Date GA Lbr Elliot/2nd Weight Sex Delivery Anes PTL Lv   1 Current              GYN HISTORY  Denies h/o sexually transmitted infections/pelvic inflammatory disease  Denies h/o gynecologic  surgeries, including biopsies of the cervix    PAST MEDICAL HISTORY  Past Medical History:   Diagnosis Date   • Acne vulgaris      PAST SURGICAL HISTORY  No past surgical history on file.    FAMILY HISTORY  Family History   Problem Relation Age of Onset   • No Known Problems Father    • No Known Problems Brother    • No Known Problems Sister    • Thyroid disease Maternal Grandmother         disorder   • Uterine cancer Maternal Grandmother    • Diabetes Maternal Aunt    • Heart disease Maternal Uncle    • Uterine cancer Other      SOCIAL HISTORY  Social History     Socioeconomic History   • Marital status: Single   Tobacco Use   • Smoking status: Never   • Smokeless tobacco: Never   • Tobacco comments:     dad smokes outside   Substance and Sexual Activity   • Alcohol use: No   • Drug use: No   • Sexual activity: Yes     Partners: Male     Birth control/protection: None     ALLERGIES  No Known Allergies    HOME MEDICATIONS  Prior to Admission medications    Medication Sig Start Date End Date Taking? Authorizing Provider   famotidine (Pepcid) 20 MG tablet Take 1 tablet by mouth Daily. 1/11/23 1/11/24  Em Barakat DO   ferrous sulfate 325 (65 FE) MG tablet Take 1 tablet by mouth 2 (Two) Times a Day. 10/31/22   Erica Alberts MD   ondansetron ODT (Zofran ODT) 8 MG disintegrating tablet Place 1 tablet on the tongue Every 8 (Eight) Hours As Needed for Nausea or Vomiting. 8/17/22   Leticia Sarmiento APRN   Prenatal Vit-Fe Fumarate-FA (Prenatal Vitamin and Mineral) 28-0.8 MG tablet Take 1 tablet by mouth Daily. 5/13/22   Leticia Sarmiento APRN     PHYSICAL EXAM  /92   Pulse 85   Temp 98.5 °F (36.9 °C) (Oral)   LMP  (LMP Unknown) Comment: patient believes last period was March  SpO2 98%   General: No acute distress. Well developed, well nourished. Pleasant.  Heart: Regular rate and rhythm. No murmurs, rubs, or gallops  Lungs: Clear to auscultation bilaterally. No wheezes,  rales, or rhonchi.  Abdomen: Soft, nontender to palpation, enlarged by gravid uterus.    NST Review  Indication: Labor  FHT: Baseline 145 bpm, moderate variability, pos accelerations, neg decelerations.  Tamms: Irregular contractions  Impression: FHT Cat 1     SVE per RN: 2-3/70/-2, vertex    IMPRESSION  Rand Santos is a 17 y.o.  at 40w4d admitted with latent labor; will augment as necessary.    PLAN  1.  IUP at 40w4d with labor  - Admit: Labor and Delivery  - Attending: Dr. Alberts  - Condition: Stable  - Vitals: per protocol  - Activity: ad celi  - Nursing: Continuous electronic fetal monitoring, as per protocol  - Diet: Clears  - IV fluids:  mL/hr  - Meds: Pitocin if needed  - Allergies: NKDA  - Labs: CBC, T&S, UDS  - GBS: POS.  Antibiotics: PCN  - Rand Santos and I have discussed pain goals for this hospitalization after reviewing her current clinical condition, medical history and prior pain experiences.  The goal is to keep her pain level appropriate.  Patient may have epidural if desired.  - Anticipate     This document has been electronically signed by Erica Alberts MD on 2023 09:20 CST.    Electronically signed by Erica Alberts MD at 23 1607       Emergency Department Notes    No notes of this type exist for this encounter.            Physician Progress Notes (last 24 hours)      Em Barakat DO at 23 0807        [facility]  Progress Note - Obstetrics    Name: Rand Santos  MRN: [id number]  Location: M757/1  Date: 2023  CSN: [id number]       PPD #1 s/p  at 40 with 4 with term labor     Patient seen and examined.  Denies headache, dizziness, chest pain, shortness of breath, nausea, vomiting.  Minimal/moderate lochia.  OOB and ambulating.  Tolerating regular diet.  Voiding without difficulty.  Breast feeding.    PHYSICAL EXAM  /85 (BP Location: Left arm, Patient Position: Lying)   Pulse 81   Temp  98.4 °F (36.9 °C) (Oral)   Resp 18   LMP  (LMP Unknown) Comment: patient believes last period was March  SpO2 98%   Breastfeeding Yes   General: AAOx3, no apparent distress.  Lungs: no increased work of breathing  CV: Acceptable rate, regular rhythm  Abdomen: +BS, soft, nondistended, uterine fundus firm, nontender, and below umbilicus.  Lower extremities: nontender, trace bilateral edema.     IMPRESSION  Rand Santos is a 17 y.o.  PPD #1 s/p .      PLAN  1.  Postpartum s/p   - Continue routine postpartum care.  - Diet: Pregnancy/breastfeeding  - DVT prophylaxis: SCDs, ambulation  - Breastfeeding  - Birth control options were discussed and patient decided on patch at 4-6 weeks PP  - Discharge to home today.  Follow-up in 2 weeks (telephone visit), 6 weeks with OB/GYN provider.       This document has been electronically signed by Em Barakat DO on 2023 08:07 CST        Electronically signed by Em Barakat DO at 23 0811       Medical Student Notes (last 24 hours)  Notes from 23 1301 through 23 1301   No notes of this type exist for this encounter.         Consult Notes (last 24 hours)  Notes from 23 1301 through 23 1301   No notes of this type exist for this encounter.

## 2023-01-18 NOTE — L&D DELIVERY NOTE
[facility]   Vaginal Delivery Note    Patient Name: Rand Santos  : 2005  MRN: [id number]    Date of Delivery: 2023     Diagnosis     Pre & Post-Delivery:  Intrauterine pregnancy at 40w4d  Labor status: Spontaneous Onset of Labor     Normal labor    Encounter for supervision of normal first pregnancy in third trimester    Pyelectasis of fetus on prenatal ultrasound    Iron deficiency anemia of pregnancy    GBS (group B Streptococcus carrier), +RV culture, currently pregnant     (normal spontaneous vaginal delivery)             Problem List    Transfer to Postpartum     Review the Delivery Report for details.     Delivery     Delivery: Vaginal, Spontaneous     YOB: 2025    Time of Birth:  Gestational Age 7:37 PM   40w4d     Anesthesia: Epidural     Delivering clinician: Em Barakat    Forceps?   No   Vacuum? No    Shoulder dystocia present: No        Delivery narrative:  Patient pushed to deliver a viable 3200g female from the AUGUSTO position over an intact perineum via  under epidural anesthesia on 2023 at 1937.  No noted nuchal cord.  Left anterior shoulder was delivered with ease, followed by right posterior shoulder.  Body was then delivered with gentle traction.  Mouth and nose bulb suctioned.  3 vessel cord clamped x2 and cut after 30 seconds of delayed clamping.  Baby was placed on mother's chest.  Cord blood was obtained and sent.  Placenta delivered spontaneously intact and Pitocin was started.  Cervix, vagina, and perineum were examined and small bilateral labial lacerations were noted.  3-0 Vicryl was used to repair the laceration in a routine fashion with good hemostasis.  EBL 250mL; QBL pending, please see nursing documentation.  Apgar scores 8/9.  Mother and infant stable.        Infant     Findings: female  infant     Infant observations: Weight: 3200 g (7 lb 0.9 oz)   Length: 19.5  in  Observations/Comments:        Apgars: 8  @ 1 minute /    9  @ 5  minutes   Infant Name: Melissa Pierre     Placenta & Cord         Placenta delivered  Spontaneous  at   1/17/2023  7:43 PM     Cord: 3 vessels  present.   Nuchal Cord?  no   Cord blood obtained: Yes    Cord gases obtained:  No    Cord gas results: Venous:  No results found for: PHCVEN    Arterial:  No results found for: PHCART     Repair      Episiotomy: None     No    Lacerations: Yes  Laceration Information  Laceration Repaired?   Perineal: None      Periurethral:       Labial: bilateral  Yes    Sulcus:       Vaginal:       Cervical:         Suture used for repair: 3-0 Vicryl   Estimated Blood Loss:       Quantitative Blood Loss: Quantitative Blood Loss (mL): 143 mL (01/17/23 2156)        Complications     none    Disposition     Mother to Mother Baby/Postpartum  in stable condition currently.  Baby to remains with mom  in stable condition currently.    Em Barakat DO  01/18/23  07:11 CST

## 2023-01-19 VITALS
OXYGEN SATURATION: 98 % | DIASTOLIC BLOOD PRESSURE: 95 MMHG | RESPIRATION RATE: 18 BRPM | TEMPERATURE: 98.5 F | SYSTOLIC BLOOD PRESSURE: 138 MMHG | HEART RATE: 73 BPM

## 2023-01-19 PROCEDURE — 0503F POSTPARTUM CARE VISIT: CPT | Performed by: STUDENT IN AN ORGANIZED HEALTH CARE EDUCATION/TRAINING PROGRAM

## 2023-01-19 RX ORDER — PSEUDOEPHEDRINE HCL 30 MG
100 TABLET ORAL 2 TIMES DAILY
Qty: 30 CAPSULE | Refills: 1 | Status: SHIPPED | OUTPATIENT
Start: 2023-01-19 | End: 2023-03-30

## 2023-01-19 RX ORDER — ACETAMINOPHEN 500 MG
1000 TABLET ORAL EVERY 6 HOURS
Qty: 40 TABLET | Refills: 1 | Status: SHIPPED | OUTPATIENT
Start: 2023-01-19 | End: 2023-03-30

## 2023-01-19 RX ORDER — IBUPROFEN 800 MG/1
800 TABLET ORAL EVERY 8 HOURS
Qty: 40 TABLET | Refills: 1 | Status: SHIPPED | OUTPATIENT
Start: 2023-01-19 | End: 2023-03-30

## 2023-01-19 RX ADMIN — DOCUSATE SODIUM 100 MG: 100 CAPSULE, LIQUID FILLED ORAL at 08:13

## 2023-01-19 RX ADMIN — ACETAMINOPHEN 1000 MG: 500 TABLET ORAL at 12:20

## 2023-01-19 RX ADMIN — ACETAMINOPHEN 1000 MG: 500 TABLET ORAL at 00:09

## 2023-01-19 RX ADMIN — PRENATAL VIT W/ FE FUMARATE-FA TAB 27-0.8 MG 1 TABLET: 27-0.8 TAB at 08:13

## 2023-01-19 RX ADMIN — FERROUS SULFATE TAB EC 324 MG (65 MG FE EQUIVALENT) 324 MG: 324 (65 FE) TABLET DELAYED RESPONSE at 08:13

## 2023-01-19 RX ADMIN — IBUPROFEN 800 MG: 800 TABLET, FILM COATED ORAL at 07:30

## 2023-01-19 RX ADMIN — IBUPROFEN 800 MG: 800 TABLET, FILM COATED ORAL at 00:09

## 2023-01-19 RX ADMIN — ACETAMINOPHEN 1000 MG: 500 TABLET ORAL at 05:36

## 2023-01-19 NOTE — DISCHARGE SUMMARY
[facility]  Discharge Summary  Patient Name: Rand Santos  : 2005  MRN: [id number]  CSN: [id number]    Discharge Summary    Date of Admission: 2023   Date of Discharge: 2023    Principle Discharge Dx: Active Hospital Problems    Diagnosis  POA   • ** (normal spontaneous vaginal delivery) [O80]  Not Applicable   • Normal labor [O80, Z37.9]  Not Applicable   • GBS (group B Streptococcus carrier), +RV culture, currently pregnant [O99.820]  Not Applicable   • Iron deficiency anemia of pregnancy [O99.019, D50.9]  Yes   • Pyelectasis of fetus on prenatal ultrasound [O35.EXX0]  Yes   • Encounter for supervision of normal first pregnancy in third trimester [Z34.03]  Not Applicable     A pos/ Rubella immune / GBS @35weeks  DatinT US, ALLYSSA 23  Genetics: Neg, GIRL  Tdap: 10/28/22  Flu: Declines  Anatomy: @TPG, repeat for pyelectasis WNL   1h Glucola: Passed  H&H/Plts:   Lab Results   Component Value Date    HGB 9.1 (L) 10/28/2022    HCT 28.1 (L) 10/28/2022     10/28/2022     Breast/BC undecided         Procedures Performed:    Brief History: Patient is a 17 y.o. now  who presented to labor and delivery at 40w4d with term labor.   Hospital Course: Patient presented with term labor.  She had an uncomplicated .  Her postpartum course was unremarkable.  On PPD #2 she expressed the desire for discharge.  She had passed gas and was urinating normally.  She was eating a regular diet without difficulty.  She was ambulating well. Today, no acute events overnight, she continues to do well. She denies SOB, dizziness, or chest pain. Discharge instructions were given.  All questions were answered   Condition:                                                                                                      Discharge Activity: Stable    Vitals:    23 0534   BP: 126/83   Pulse: 88   Resp: 18   Temp: 98.3 °F (36.8 °C)   SpO2: 99%       Physical  Exam  Constitutional:       Appearance: Normal appearance.   Genitourinary:      Genitourinary Comments: Mild lochia   HENT:      Mouth/Throat:      Mouth: Mucous membranes are moist.   Eyes:      Extraocular Movements: Extraocular movements intact.   Cardiovascular:      Rate and Rhythm: Normal rate and regular rhythm.      Pulses: Normal pulses.      Heart sounds: Normal heart sounds.   Pulmonary:      Effort: Pulmonary effort is normal.      Breath sounds: Normal breath sounds.   Abdominal:      General: There is no distension.      Tenderness: There is no abdominal tenderness. There is no guarding.      Comments: Fundus firm   Musculoskeletal:         General: Normal range of motion.      Cervical back: Normal range of motion and neck supple.   Neurological:      General: No focal deficit present.      Mental Status: She is alert and oriented to person, place, and time.   Skin:     General: Skin is warm and dry.   Psychiatric:         Mood and Affect: Mood normal.         Behavior: Behavior normal.   Vitals reviewed.             Activity Instructions     Bathing Restrictions      Type of Restriction: Bathing    Bathing Restrictions: Other    Explain Bathing Restrictions: No soaking in bathtub for 4 weeks. Showers are fine.    Driving Restrictions      Type of Restriction: Driving    Driving Restrictions: No Driving (Time Limited)    Length: Other    Indicate Length of Restriction: No driving for 1 week or if using narcotic pain medications. Riding is car is fine.    Lifting Restrictions      Type of Restriction: Lifting    Lifting Restrictions: Other    Explain Lifing Restrictions: No lifting more than infant and baby carrier together for 6 weeks.    Pelvic Rest      Nothing in the vagina for 6 weeks to include tampons, intercourse, or douching.    Sexual Activity Restrictions      Type of Restriction: Sex    Explain Sexual Activity Restrictions: No sexual intercourse for at least 6 weeks         Discharge Diet:  Diet Instructions     Diet: Regular      Discharge Diet: Regular         Discharge Medications:    Your medication list      START taking these medications      Instructions Last Dose Given Next Dose Due   acetaminophen 500 MG tablet  Commonly known as: TYLENOL      Take 2 tablets by mouth Every 6 (Six) Hours.       docusate sodium 100 MG capsule      Take 1 capsule by mouth 2 (Two) Times a Day.       ibuprofen 800 MG tablet  Commonly known as: ADVIL,MOTRIN      Take 1 tablet by mouth Every 8 (Eight) Hours.          CONTINUE taking these medications      Instructions Last Dose Given Next Dose Due   ferrous sulfate 325 (65 FE) MG tablet      Take 1 tablet by mouth 2 (Two) Times a Day.       Prenatal Vitamin and Mineral 28-0.8 MG tablet      Take 1 tablet by mouth Daily.          STOP taking these medications    famotidine 20 MG tablet  Commonly known as: Pepcid        ondansetron ODT 8 MG disintegrating tablet  Commonly known as: Zofran ODT              Where to Get Your Medications      These medications were sent to [facility] [address] [place] [address] [address] [place] [address] - [phone]  - [phone] [address] [address]    Phone: [phone] ·   acetaminophen 500 MG tablet  · docusate sodium 100 MG capsule  · ibuprofen 800 MG tablet                Discharge Disposition:   -Contraception: Patch  -breastfeeding      Home   Follow-up:   2 week PP visit (telephone)  6 week PP visit     <30 minutes was spent with the patient on the day of discharge.    This document has been electronically signed by Pierre Kiran MD on January 19, 2023 07:27 CST.    I personally saw and examined the patient with Dr. Campbell and was present during the key portion of the E/M service. I agree with the history and exam as documented by the resident.  Otherwise, I agree with the assessment and plan as outlined above.        This document has been electronically signed by  Em Barakat DO on January 20, 2023 15:16 CST

## 2023-01-19 NOTE — PLAN OF CARE
Goal Outcome Evaluation:  Plan of Care Reviewed With: patient, significant other        Progress: improving  Outcome Evaluation: VSS, FF, lochia light, ambulated in the room. pain controlled with schedule meds. Give and Educate patient with the postpartum and  care booklet. use the manual breast pump.

## 2023-01-19 NOTE — PLAN OF CARE
Goal Outcome Evaluation:  Plan of Care Reviewed With: patient        Progress: improving  Outcome Evaluation: FF, lochia light.  VSS.  ambulating in room, voiding, pain controlled with scheduled meds.  Breastfeeding well.

## 2023-01-19 NOTE — PLAN OF CARE
Goal Outcome Evaluation:  Plan of Care Reviewed With: patient, significant other           Outcome Evaluation: VSS, FF, lochia light, ambulating in room, breastfeeding well, pain controlled with scheduled meds

## 2023-01-20 NOTE — PAYOR COMM NOTE
"  [facility]  Case Managment Extender   Sayra Braun  (P) [phone]  (F) [phone]          Michaela Hermosillo May (17 y.o. Female)     Date of Birth   2005    Social Security Number       Address   [address] [place] [address]    Home Phone   [phone]    MRN   [id number]       [employer]   Unknown    Marital Status   Single                            Admission Date   23    Admission Type   Elective    Admitting Provider   Erica Alberts MD    Attending Provider       Department, Room/Bed   [facility] MOTHER BABY, M757/1       Discharge Date   2023    Discharge Disposition   Home or Self Care    Discharge Destination                               Attending Provider: (none)   Allergies: No Known Allergies    Isolation: None   Infection: None   Code Status: Prior    Ht: 154.9 cm (61\")   Wt: 77.9 kg (171 lb 12.8 oz)    Admission Cmt: None   Principal Problem:  (normal spontaneous vaginal delivery) [O80]                 Active Insurance as of 2023     Primary Coverage     Payor Plan Insurance Group Employer/Plan Group    [place]      Payor Plan Address Payor Plan Phone Number Payor Plan Fax Number Effective Dates    [address] [address] [phone]  10/21/2016 - None Entered    [facility] [address]       Subscriber Name Subscriber Birth Date Member ID       MICHAELA HERMOSILLO MAY 2005 [id number]                 Emergency Contacts      (Rel.) Home Phone Work Phone Mobile Phone    Shelly Hermosillo (Mother) [phone] -- [phone]            Discharge Order (From admission, onward)     Start     Ordered    23 0724  Discharge patient  Once        Expected Discharge Date: 23    Discharge Disposition: Home or Self Care    Physician of Record for Attribution - Please select from Treatment Team: YOGI QUEZADA [[id number]]    Review needed by CMO to determine Physician " of Record: No       Question Answer Comment   Physician of Record for Attribution - Please select from Treatment Team YOGI QUEZADA    Review needed by CMO to determine Physician of Record No        01/19/23 0719

## 2023-02-02 ENCOUNTER — POSTPARTUM VISIT (OUTPATIENT)
Dept: OBSTETRICS AND GYNECOLOGY | Facility: CLINIC | Age: 18
End: 2023-02-02
Payer: COMMERCIAL

## 2023-02-02 VITALS
SYSTOLIC BLOOD PRESSURE: 124 MMHG | HEIGHT: 61 IN | DIASTOLIC BLOOD PRESSURE: 92 MMHG | WEIGHT: 150 LBS | BODY MASS INDEX: 28.32 KG/M2

## 2023-02-02 PROBLEM — O99.353 PREGNANCY RELATED CARPAL TUNNEL SYNDROME IN THIRD TRIMESTER: Status: RESOLVED | Noted: 2022-11-28 | Resolved: 2023-02-02

## 2023-02-02 PROBLEM — R51.9 HEADACHE IN PREGNANCY, ANTEPARTUM, THIRD TRIMESTER: Status: RESOLVED | Noted: 2022-07-20 | Resolved: 2023-02-02

## 2023-02-02 PROBLEM — Z37.9 NORMAL LABOR: Status: RESOLVED | Noted: 2023-01-17 | Resolved: 2023-02-02

## 2023-02-02 PROBLEM — G56.00 PREGNANCY RELATED CARPAL TUNNEL SYNDROME IN THIRD TRIMESTER: Status: RESOLVED | Noted: 2022-11-28 | Resolved: 2023-02-02

## 2023-02-02 PROBLEM — O35.EXX0 PYELECTASIS OF FETUS ON PRENATAL ULTRASOUND: Status: RESOLVED | Noted: 2022-09-30 | Resolved: 2023-02-02

## 2023-02-02 PROBLEM — Z34.03 ENCOUNTER FOR SUPERVISION OF NORMAL FIRST PREGNANCY IN THIRD TRIMESTER: Status: RESOLVED | Noted: 2022-06-22 | Resolved: 2023-02-02

## 2023-02-02 PROBLEM — O99.019 IRON DEFICIENCY ANEMIA DURING PREGNANCY: Status: RESOLVED | Noted: 2022-11-28 | Resolved: 2023-02-02

## 2023-02-02 PROBLEM — O99.019 IRON DEFICIENCY ANEMIA OF PREGNANCY: Status: RESOLVED | Noted: 2022-11-16 | Resolved: 2023-02-02

## 2023-02-02 PROBLEM — D50.9 IRON DEFICIENCY ANEMIA OF PREGNANCY: Status: RESOLVED | Noted: 2022-11-16 | Resolved: 2023-02-02

## 2023-02-02 PROBLEM — O99.820 GBS (GROUP B STREPTOCOCCUS CARRIER), +RV CULTURE, CURRENTLY PREGNANT: Status: RESOLVED | Noted: 2022-12-22 | Resolved: 2023-02-02

## 2023-02-02 PROBLEM — D50.9 IRON DEFICIENCY ANEMIA DURING PREGNANCY: Status: RESOLVED | Noted: 2022-11-28 | Resolved: 2023-02-02

## 2023-02-02 PROBLEM — O26.893 HEADACHE IN PREGNANCY, ANTEPARTUM, THIRD TRIMESTER: Status: RESOLVED | Noted: 2022-07-20 | Resolved: 2023-02-02

## 2023-02-02 PROCEDURE — 0503F POSTPARTUM CARE VISIT: CPT | Performed by: NURSE PRACTITIONER

## 2023-02-02 NOTE — PROGRESS NOTES
"Subjective   Chief Complaint   Patient presents with   • Postpartum Care     Randwillow Santos is a 17 y.o. year old  presenting to be seen for her postpartum visit.  She had a vaginal delivery.   Prenatal course was been benign.    Since delivery she has not been sexually active.  She does not have concerns about post-partum blues/depression. EPDS: 0.  She is both breast and bottle feeding due to decreased milk supply.    The following portions of the patient's history were reviewed and updated as appropriate:current medications and allergies    Social History    Tobacco Use      Smoking status: Never      Smokeless tobacco: Never      Tobacco comments: dad smokes outside    Review of Systems   Constitutional: Negative for chills, diaphoresis, fatigue, fever and unexpected weight change.   Respiratory: Negative for apnea, chest tightness and shortness of breath.    Cardiovascular: Negative for chest pain and palpitations.   Gastrointestinal: Negative for abdominal distention, abdominal pain, constipation and diarrhea.   Genitourinary: Positive for vaginal bleeding. Negative for decreased urine volume, difficulty urinating, dysuria, enuresis, flank pain, frequency, genital sores, hematuria, pelvic pain, urgency, vaginal discharge and vaginal pain.   Skin: Negative for rash.   Neurological: Positive for headaches.   Psychiatric/Behavioral: Negative for sleep disturbance and suicidal ideas.         Objective   /92 (BP Location: Left arm, Patient Position: Sitting)   Ht 154.9 cm (61\")   Wt 68 kg (150 lb)   LMP  (LMP Unknown) Comment: patient believes last period was March  Breastfeeding Yes   BMI 28.34 kg/m²      Re-checked BP in left arm sitting- 120/82    General:  well developed; well nourished  no acute distress   Abdomen: soft, non-tender; no masses  no umbilical or inguinal hernias are present  no hepato-splenomegaly  fundus firm and non-tender  Normal findings: soft, non-tender, bowel sounds " normal, no masses palpable and symmetric   Pelvis: Not performed.            Diagnoses and all orders for this visit:    Postpartum follow-up        No orders of the defined types were placed in this encounter.    Discussed pp contraception. Pt desires TD patches.  Did not recommend patches due to BF.  Discussed contraception options.  No intercourse before next visit.  Pt desires Mirena IUD.  RTC for 6 wk pp visit and Mirena IUD insertion.      This note was electronically signed.    ROSINA Bhatia  February 2, 2023

## 2023-03-15 ENCOUNTER — POSTPARTUM VISIT (OUTPATIENT)
Dept: OBSTETRICS AND GYNECOLOGY | Facility: CLINIC | Age: 18
End: 2023-03-15
Payer: COMMERCIAL

## 2023-03-15 VITALS
BODY MASS INDEX: 28.32 KG/M2 | SYSTOLIC BLOOD PRESSURE: 110 MMHG | HEIGHT: 61 IN | WEIGHT: 150 LBS | DIASTOLIC BLOOD PRESSURE: 70 MMHG

## 2023-03-15 PROCEDURE — 0503F POSTPARTUM CARE VISIT: CPT | Performed by: STUDENT IN AN ORGANIZED HEALTH CARE EDUCATION/TRAINING PROGRAM

## 2023-03-15 NOTE — PROGRESS NOTES
"Postpartum visit      Rand Santos is a 17 y.o.  s/p This patient has no babies on file.on 23 at 40+4 secondary to normal labor who presents today for postpartum check.  The patient states doing well.  Patient denies postpartum depression.  Menstrual cycles have resumed. Thinks had period week after going home and now spotting last couple of days.  Breast and bottle feeding.  Desires LNG-IUD for contraception.  She has resumed sexual intercourse, last around 3/11, no condoms.  Denies bowel or bladder issues.    23  40+4, birth weight 3200 g, APGARs 8+9, Melissa May    PHYSICAL EXAM:    /70 (BP Location: Left arm, Patient Position: Sitting, Cuff Size: Adult)   Ht 154.9 cm (61\")   Wt 68 kg (150 lb)   Breastfeeding Yes   BMI 28.34 kg/m²   Breast Exam: Lactating, no masses, skin dimpling, nipple retraction, or nipple discharge bilaterally.  Abdomen: benign, no masses, soft, non-tender.  Incision: Well-healed, clean, dry, intact.  Deferred to IUD  Extremities: No deep calf tenderness.  Postpartum Depression Screening Questionnaire: 1, no treatment indicated.    IMPRESSION/PLAN: Rand Santos is a 17 y.o.  s/p This patient has no babies on file.on 23.  Doing well.    - Recovered nicely from her delivery  - Contraception: return in 2 weeks for UPT and LNG-IUD, if any intercourse prior to use condoms  - No pap due to age      This document has been electronically signed by Em Barakat DO on March 15, 2023 14:41 CDT       Answers for HPI/ROS submitted by the patient on 3/14/2023  Please describe your symptoms.: Dont have any  Have you had these symptoms before?: No  How long have you been having these symptoms?: Greater than 2 weeks  What is the primary reason for your visit?: Other      "

## 2023-03-30 ENCOUNTER — PROCEDURE VISIT (OUTPATIENT)
Dept: OBSTETRICS AND GYNECOLOGY | Facility: CLINIC | Age: 18
End: 2023-03-30
Payer: COMMERCIAL

## 2023-03-30 VITALS
DIASTOLIC BLOOD PRESSURE: 72 MMHG | WEIGHT: 145 LBS | SYSTOLIC BLOOD PRESSURE: 114 MMHG | HEIGHT: 61 IN | BODY MASS INDEX: 27.38 KG/M2

## 2023-03-30 DIAGNOSIS — Z30.430 ENCOUNTER FOR INSERTION OF MIRENA IUD: Primary | ICD-10-CM

## 2023-03-30 LAB
B-HCG UR QL: NEGATIVE
EXPIRATION DATE: NORMAL
INTERNAL NEGATIVE CONTROL: NEGATIVE
INTERNAL POSITIVE CONTROL: POSITIVE
Lab: NORMAL

## 2023-03-30 RX ORDER — ONDANSETRON 4 MG/1
4 TABLET, ORALLY DISINTEGRATING ORAL EVERY 8 HOURS PRN
Qty: 20 TABLET | Refills: 0 | Status: SHIPPED | OUTPATIENT
Start: 2023-03-30

## 2023-03-30 NOTE — PROGRESS NOTES
IUD Insertion    Patient's last menstrual period was 02/27/2023 (approximate).    Date of procedure:  3/30/2023    Risks and benefits discussed? yes  All questions answered? yes  Consents given by The patient  Written consent obtained? yes  UPT?          negative   Local anesthesia used:  no    Procedure documentation:    After verifying the patient had a low probability of being pregnant and met the criteria for insertion, a speculum has placed and the cervix was cleansed with an antiseptic solution.  The anterior lip of the cervix was grasped and the uterine cavity was gently sounded. There was mild difficulty passing the sound through the cervix.  Cervical dilation did not need to be performed prior to placing the IUD.  The uterus was anteverted and sounded to 8 cms.  The Mirena was then prepared per the manufacturers instructions.    The Mirena was advanced to a point 2 cms from the fundus and then the arms were released from the sheath.  The device was advanced to the fundus and the device was released fully from the sheath.. The string was cut 3 cms in length.  Bleeding from the cervix was scant.    Mirena 97132-387-53    She tolerated the procedure without any difficulty.    Post procedure instructions: Call if any fever or excessive bleeding or pain.    Follow up needed:  PRN for problems        This document has been electronically signed by ROSINA Rees on March 30, 2023 11:45 CDT        ROSINA Rees  March 30, 2023

## 2023-04-21 RX ORDER — ONDANSETRON 4 MG/1
4 TABLET, ORALLY DISINTEGRATING ORAL EVERY 8 HOURS PRN
Qty: 20 TABLET | Refills: 0 | Status: SHIPPED | OUTPATIENT
Start: 2023-04-21

## 2023-04-21 RX ORDER — IBUPROFEN 600 MG/1
600 TABLET ORAL EVERY 6 HOURS PRN
Qty: 30 TABLET | Refills: 0 | Status: SHIPPED | OUTPATIENT
Start: 2023-04-21

## 2023-09-08 ENCOUNTER — OFFICE VISIT (OUTPATIENT)
Dept: OBSTETRICS AND GYNECOLOGY | Facility: CLINIC | Age: 18
End: 2023-09-08
Payer: MEDICAID

## 2023-09-08 VITALS
WEIGHT: 150.4 LBS | SYSTOLIC BLOOD PRESSURE: 102 MMHG | HEART RATE: 76 BPM | BODY MASS INDEX: 28.4 KG/M2 | DIASTOLIC BLOOD PRESSURE: 70 MMHG | HEIGHT: 61 IN

## 2023-09-08 DIAGNOSIS — Z30.431 IUD CHECK UP: Primary | ICD-10-CM

## 2023-09-08 PROCEDURE — 1160F RVW MEDS BY RX/DR IN RCRD: CPT | Performed by: NURSE PRACTITIONER

## 2023-09-08 PROCEDURE — 1159F MED LIST DOCD IN RCRD: CPT | Performed by: NURSE PRACTITIONER

## 2023-09-08 PROCEDURE — 99213 OFFICE O/P EST LOW 20 MIN: CPT | Performed by: NURSE PRACTITIONER

## 2023-09-08 RX ORDER — LEVONORGESTREL 52 MG/1
1 INTRAUTERINE DEVICE INTRAUTERINE
COMMUNITY

## 2023-09-08 NOTE — PROGRESS NOTES
Morgan Santos is a 18 y.o. female.     History of Present Illness   Pt presents for Mirena string check. Inserted 3/30/23 and pt missed her FU 6wk appt. She states she is doing well with it. Bleeds about 1x a month, just 1 day, light. Does have some cramping but it is tolerable. Monogamous and no concerns for STI.     The following portions of the patient's history were reviewed and updated as appropriate: allergies, current medications, past family history, past medical history, past social history, past surgical history, and problem list.    Review of Systems   Constitutional: Negative.  Negative for chills and fever.   Respiratory: Negative.     Cardiovascular: Negative.    Gastrointestinal:  Negative for abdominal pain.   Genitourinary:  Negative for dyspareunia, dysuria, menstrual problem, pelvic pain, vaginal bleeding, vaginal discharge and vaginal pain.   Neurological:  Negative for dizziness, syncope and light-headedness.     Objective   Physical Exam  Vitals reviewed. Exam conducted with a chaperone present.   Constitutional:       General: She is not in acute distress.     Appearance: Normal appearance. She is not ill-appearing.   Cardiovascular:      Rate and Rhythm: Normal rate.   Pulmonary:      Effort: Pulmonary effort is normal.   Genitourinary:     General: Normal vulva.      Labia:         Right: No rash, tenderness, lesion or injury.         Left: No rash, tenderness, lesion or injury.       Vagina: Normal. No vaginal discharge or erythema.      Cervix: Normal. No cervical motion tenderness, discharge or friability.      Uterus: Normal.       Adnexa: Right adnexa normal and left adnexa normal.      Comments: IUD strings noted at 2cm out of cervix.   Neurological:      Mental Status: She is alert and oriented to person, place, and time.   Psychiatric:         Mood and Affect: Mood normal.         Behavior: Behavior normal.       Assessment & Plan   Diagnoses and all orders for this  visit:    1. IUD check up (Primary)      Reassured pt that some cramping and bleeding are common with IUD. It seems cyclic. Strings are at appropriate length. Pt is pleased with changes since insertion and desires continued use.     RTC annually for WWE or sooner PRN.